# Patient Record
Sex: MALE | Race: OTHER | HISPANIC OR LATINO
[De-identification: names, ages, dates, MRNs, and addresses within clinical notes are randomized per-mention and may not be internally consistent; named-entity substitution may affect disease eponyms.]

---

## 2022-07-18 ENCOUNTER — NON-APPOINTMENT (OUTPATIENT)
Age: 47
End: 2022-07-18

## 2022-07-18 ENCOUNTER — APPOINTMENT (OUTPATIENT)
Dept: OPHTHALMOLOGY | Facility: CLINIC | Age: 47
End: 2022-07-18

## 2022-07-18 PROCEDURE — 92004 COMPRE OPH EXAM NEW PT 1/>: CPT

## 2022-07-18 PROCEDURE — 92015 DETERMINE REFRACTIVE STATE: CPT

## 2022-07-30 ENCOUNTER — INPATIENT (INPATIENT)
Facility: HOSPITAL | Age: 47
LOS: 1 days | Discharge: HOME | DRG: 512 | End: 2022-08-01
Attending: ORTHOPAEDIC SURGERY | Admitting: ORTHOPAEDIC SURGERY
Payer: COMMERCIAL

## 2022-07-30 ENCOUNTER — TRANSCRIPTION ENCOUNTER (OUTPATIENT)
Age: 47
End: 2022-07-30

## 2022-07-30 ENCOUNTER — EMERGENCY (EMERGENCY)
Facility: HOSPITAL | Age: 47
LOS: 1 days | Discharge: ANOTHER HOSPITAL | End: 2022-07-30
Attending: EMERGENCY MEDICINE
Payer: COMMERCIAL

## 2022-07-30 VITALS
RESPIRATION RATE: 20 BRPM | DIASTOLIC BLOOD PRESSURE: 88 MMHG | SYSTOLIC BLOOD PRESSURE: 133 MMHG | TEMPERATURE: 98 F | OXYGEN SATURATION: 96 % | HEART RATE: 84 BPM

## 2022-07-30 VITALS
HEIGHT: 67 IN | SYSTOLIC BLOOD PRESSURE: 134 MMHG | TEMPERATURE: 98 F | WEIGHT: 160.06 LBS | RESPIRATION RATE: 16 BRPM | OXYGEN SATURATION: 100 % | HEART RATE: 85 BPM | DIASTOLIC BLOOD PRESSURE: 76 MMHG

## 2022-07-30 VITALS
SYSTOLIC BLOOD PRESSURE: 112 MMHG | WEIGHT: 158.73 LBS | RESPIRATION RATE: 16 BRPM | HEART RATE: 62 BPM | TEMPERATURE: 99 F | OXYGEN SATURATION: 99 % | DIASTOLIC BLOOD PRESSURE: 68 MMHG

## 2022-07-30 LAB
ALBUMIN SERPL ELPH-MCNC: 3.9 G/DL — SIGNIFICANT CHANGE UP (ref 3.5–5)
ALBUMIN SERPL ELPH-MCNC: 4.7 G/DL — SIGNIFICANT CHANGE UP (ref 3.3–5)
ALP SERPL-CCNC: 91 U/L — SIGNIFICANT CHANGE UP (ref 40–120)
ALP SERPL-CCNC: 92 U/L — SIGNIFICANT CHANGE UP (ref 40–120)
ALT FLD-CCNC: 36 U/L — SIGNIFICANT CHANGE UP (ref 10–45)
ALT FLD-CCNC: 54 U/L DA — SIGNIFICANT CHANGE UP (ref 10–60)
ANION GAP SERPL CALC-SCNC: 12 MMOL/L — SIGNIFICANT CHANGE UP (ref 5–17)
ANION GAP SERPL CALC-SCNC: 15 MMOL/L — SIGNIFICANT CHANGE UP (ref 5–17)
APTT BLD: 23.6 SEC — LOW (ref 27.5–35.5)
APTT BLD: 24 SEC — LOW (ref 27.5–35.5)
AST SERPL-CCNC: 23 U/L — SIGNIFICANT CHANGE UP (ref 10–40)
AST SERPL-CCNC: 27 U/L — SIGNIFICANT CHANGE UP (ref 10–40)
BASOPHILS # BLD AUTO: 0.02 K/UL — SIGNIFICANT CHANGE UP (ref 0–0.2)
BASOPHILS # BLD AUTO: 0.07 K/UL — SIGNIFICANT CHANGE UP (ref 0–0.2)
BASOPHILS NFR BLD AUTO: 0.1 % — SIGNIFICANT CHANGE UP (ref 0–2)
BASOPHILS NFR BLD AUTO: 0.3 % — SIGNIFICANT CHANGE UP (ref 0–2)
BILIRUB SERPL-MCNC: 1.3 MG/DL — HIGH (ref 0.2–1.2)
BILIRUB SERPL-MCNC: 1.6 MG/DL — HIGH (ref 0.2–1.2)
BLD GP AB SCN SERPL QL: NEGATIVE — SIGNIFICANT CHANGE UP
BUN SERPL-MCNC: 23 MG/DL — SIGNIFICANT CHANGE UP (ref 7–23)
BUN SERPL-MCNC: 24 MG/DL — HIGH (ref 7–18)
CALCIUM SERPL-MCNC: 10.2 MG/DL — SIGNIFICANT CHANGE UP (ref 8.4–10.5)
CALCIUM SERPL-MCNC: 9.2 MG/DL — SIGNIFICANT CHANGE UP (ref 8.4–10.5)
CHLORIDE SERPL-SCNC: 104 MMOL/L — SIGNIFICANT CHANGE UP (ref 96–108)
CHLORIDE SERPL-SCNC: 110 MMOL/L — HIGH (ref 96–108)
CO2 SERPL-SCNC: 21 MMOL/L — LOW (ref 22–31)
CO2 SERPL-SCNC: 21 MMOL/L — LOW (ref 22–31)
CREAT SERPL-MCNC: 1.24 MG/DL — SIGNIFICANT CHANGE UP (ref 0.5–1.3)
CREAT SERPL-MCNC: 1.61 MG/DL — HIGH (ref 0.5–1.3)
EGFR: 52 ML/MIN/1.73M2 — LOW
EGFR: 72 ML/MIN/1.73M2 — SIGNIFICANT CHANGE UP
EOSINOPHIL # BLD AUTO: 0 K/UL — SIGNIFICANT CHANGE UP (ref 0–0.5)
EOSINOPHIL # BLD AUTO: 0.01 K/UL — SIGNIFICANT CHANGE UP (ref 0–0.5)
EOSINOPHIL NFR BLD AUTO: 0 % — SIGNIFICANT CHANGE UP (ref 0–6)
EOSINOPHIL NFR BLD AUTO: 0 % — SIGNIFICANT CHANGE UP (ref 0–6)
GLUCOSE SERPL-MCNC: 107 MG/DL — HIGH (ref 70–99)
GLUCOSE SERPL-MCNC: 86 MG/DL — SIGNIFICANT CHANGE UP (ref 70–99)
HCT VFR BLD CALC: 43.7 % — SIGNIFICANT CHANGE UP (ref 39–50)
HCT VFR BLD CALC: 45.7 % — SIGNIFICANT CHANGE UP (ref 39–50)
HGB BLD-MCNC: 14.8 G/DL — SIGNIFICANT CHANGE UP (ref 13–17)
HGB BLD-MCNC: 15.5 G/DL — SIGNIFICANT CHANGE UP (ref 13–17)
IMM GRANULOCYTES NFR BLD AUTO: 0.6 % — SIGNIFICANT CHANGE UP (ref 0–1.5)
IMM GRANULOCYTES NFR BLD AUTO: 0.7 % — SIGNIFICANT CHANGE UP (ref 0–1.5)
INR BLD: 1.07 RATIO — SIGNIFICANT CHANGE UP (ref 0.88–1.16)
INR BLD: 1.12 RATIO — SIGNIFICANT CHANGE UP (ref 0.88–1.16)
LYMPHOCYTES # BLD AUTO: 0.76 K/UL — LOW (ref 1–3.3)
LYMPHOCYTES # BLD AUTO: 10.4 % — LOW (ref 13–44)
LYMPHOCYTES # BLD AUTO: 2.27 K/UL — SIGNIFICANT CHANGE UP (ref 1–3.3)
LYMPHOCYTES # BLD AUTO: 4.9 % — LOW (ref 13–44)
MCHC RBC-ENTMCNC: 30.1 PG — SIGNIFICANT CHANGE UP (ref 27–34)
MCHC RBC-ENTMCNC: 30.3 PG — SIGNIFICANT CHANGE UP (ref 27–34)
MCHC RBC-ENTMCNC: 33.9 GM/DL — SIGNIFICANT CHANGE UP (ref 32–36)
MCHC RBC-ENTMCNC: 33.9 GM/DL — SIGNIFICANT CHANGE UP (ref 32–36)
MCV RBC AUTO: 88.7 FL — SIGNIFICANT CHANGE UP (ref 80–100)
MCV RBC AUTO: 89.4 FL — SIGNIFICANT CHANGE UP (ref 80–100)
MONOCYTES # BLD AUTO: 0.46 K/UL — SIGNIFICANT CHANGE UP (ref 0–0.9)
MONOCYTES # BLD AUTO: 1.16 K/UL — HIGH (ref 0–0.9)
MONOCYTES NFR BLD AUTO: 3 % — SIGNIFICANT CHANGE UP (ref 2–14)
MONOCYTES NFR BLD AUTO: 5.3 % — SIGNIFICANT CHANGE UP (ref 2–14)
NEUTROPHILS # BLD AUTO: 14.11 K/UL — HIGH (ref 1.8–7.4)
NEUTROPHILS # BLD AUTO: 18.11 K/UL — HIGH (ref 1.8–7.4)
NEUTROPHILS NFR BLD AUTO: 83.3 % — HIGH (ref 43–77)
NEUTROPHILS NFR BLD AUTO: 91.4 % — HIGH (ref 43–77)
NRBC # BLD: 0 /100 WBCS — SIGNIFICANT CHANGE UP (ref 0–0)
NRBC # BLD: 0 /100 WBCS — SIGNIFICANT CHANGE UP (ref 0–0)
PLATELET # BLD AUTO: 244 K/UL — SIGNIFICANT CHANGE UP (ref 150–400)
PLATELET # BLD AUTO: 296 K/UL — SIGNIFICANT CHANGE UP (ref 150–400)
POTASSIUM SERPL-MCNC: 3.7 MMOL/L — SIGNIFICANT CHANGE UP (ref 3.5–5.3)
POTASSIUM SERPL-MCNC: 4.4 MMOL/L — SIGNIFICANT CHANGE UP (ref 3.5–5.3)
POTASSIUM SERPL-SCNC: 3.7 MMOL/L — SIGNIFICANT CHANGE UP (ref 3.5–5.3)
POTASSIUM SERPL-SCNC: 4.4 MMOL/L — SIGNIFICANT CHANGE UP (ref 3.5–5.3)
PROT SERPL-MCNC: 7.3 G/DL — SIGNIFICANT CHANGE UP (ref 6–8.3)
PROT SERPL-MCNC: 7.5 G/DL — SIGNIFICANT CHANGE UP (ref 6–8.3)
PROTHROM AB SERPL-ACNC: 12.8 SEC — SIGNIFICANT CHANGE UP (ref 10.5–13.4)
PROTHROM AB SERPL-ACNC: 13 SEC — SIGNIFICANT CHANGE UP (ref 10.5–13.4)
RBC # BLD: 4.89 M/UL — SIGNIFICANT CHANGE UP (ref 4.2–5.8)
RBC # BLD: 5.15 M/UL — SIGNIFICANT CHANGE UP (ref 4.2–5.8)
RBC # FLD: 11.6 % — SIGNIFICANT CHANGE UP (ref 10.3–14.5)
RBC # FLD: 11.7 % — SIGNIFICANT CHANGE UP (ref 10.3–14.5)
RH IG SCN BLD-IMP: POSITIVE — SIGNIFICANT CHANGE UP
SARS-COV-2 RNA SPEC QL NAA+PROBE: SIGNIFICANT CHANGE UP
SODIUM SERPL-SCNC: 140 MMOL/L — SIGNIFICANT CHANGE UP (ref 135–145)
SODIUM SERPL-SCNC: 143 MMOL/L — SIGNIFICANT CHANGE UP (ref 135–145)
WBC # BLD: 15.44 K/UL — HIGH (ref 3.8–10.5)
WBC # BLD: 21.77 K/UL — HIGH (ref 3.8–10.5)
WBC # FLD AUTO: 15.44 K/UL — HIGH (ref 3.8–10.5)
WBC # FLD AUTO: 21.77 K/UL — HIGH (ref 3.8–10.5)

## 2022-07-30 PROCEDURE — 99285 EMERGENCY DEPT VISIT HI MDM: CPT | Mod: 25

## 2022-07-30 PROCEDURE — 90715 TDAP VACCINE 7 YRS/> IM: CPT

## 2022-07-30 PROCEDURE — 86901 BLOOD TYPING SEROLOGIC RH(D): CPT

## 2022-07-30 PROCEDURE — 73090 X-RAY EXAM OF FOREARM: CPT | Mod: 26,LT,77

## 2022-07-30 PROCEDURE — 90471 IMMUNIZATION ADMIN: CPT

## 2022-07-30 PROCEDURE — 87635 SARS-COV-2 COVID-19 AMP PRB: CPT

## 2022-07-30 PROCEDURE — 73110 X-RAY EXAM OF WRIST: CPT | Mod: 26,LT

## 2022-07-30 PROCEDURE — 86900 BLOOD TYPING SEROLOGIC ABO: CPT

## 2022-07-30 PROCEDURE — 71045 X-RAY EXAM CHEST 1 VIEW: CPT | Mod: 26

## 2022-07-30 PROCEDURE — 73090 X-RAY EXAM OF FOREARM: CPT

## 2022-07-30 PROCEDURE — 85730 THROMBOPLASTIN TIME PARTIAL: CPT

## 2022-07-30 PROCEDURE — 71045 X-RAY EXAM CHEST 1 VIEW: CPT | Mod: 26,77

## 2022-07-30 PROCEDURE — 85610 PROTHROMBIN TIME: CPT

## 2022-07-30 PROCEDURE — 73090 X-RAY EXAM OF FOREARM: CPT | Mod: 26,LT

## 2022-07-30 PROCEDURE — 99285 EMERGENCY DEPT VISIT HI MDM: CPT

## 2022-07-30 PROCEDURE — 93010 ELECTROCARDIOGRAM REPORT: CPT

## 2022-07-30 PROCEDURE — 71045 X-RAY EXAM CHEST 1 VIEW: CPT

## 2022-07-30 PROCEDURE — 86850 RBC ANTIBODY SCREEN: CPT

## 2022-07-30 PROCEDURE — 73080 X-RAY EXAM OF ELBOW: CPT | Mod: 26,LT

## 2022-07-30 PROCEDURE — 85025 COMPLETE CBC W/AUTO DIFF WBC: CPT

## 2022-07-30 PROCEDURE — 36415 COLL VENOUS BLD VENIPUNCTURE: CPT

## 2022-07-30 PROCEDURE — 96374 THER/PROPH/DIAG INJ IV PUSH: CPT

## 2022-07-30 PROCEDURE — 80053 COMPREHEN METABOLIC PANEL: CPT

## 2022-07-30 PROCEDURE — 96375 TX/PRO/DX INJ NEW DRUG ADDON: CPT

## 2022-07-30 RX ORDER — HYDROMORPHONE HYDROCHLORIDE 2 MG/ML
1 INJECTION INTRAMUSCULAR; INTRAVENOUS; SUBCUTANEOUS ONCE
Refills: 0 | Status: DISCONTINUED | OUTPATIENT
Start: 2022-07-30 | End: 2022-07-30

## 2022-07-30 RX ORDER — CEFAZOLIN SODIUM 1 G
1000 VIAL (EA) INJECTION ONCE
Refills: 0 | Status: COMPLETED | OUTPATIENT
Start: 2022-07-30 | End: 2022-07-30

## 2022-07-30 RX ORDER — MORPHINE SULFATE 50 MG/1
4 CAPSULE, EXTENDED RELEASE ORAL ONCE
Refills: 0 | Status: DISCONTINUED | OUTPATIENT
Start: 2022-07-30 | End: 2022-07-30

## 2022-07-30 RX ORDER — SODIUM CHLORIDE 9 MG/ML
1000 INJECTION, SOLUTION INTRAVENOUS
Refills: 0 | Status: DISCONTINUED | OUTPATIENT
Start: 2022-07-30 | End: 2022-07-31

## 2022-07-30 RX ORDER — TETANUS TOXOID, REDUCED DIPHTHERIA TOXOID AND ACELLULAR PERTUSSIS VACCINE, ADSORBED 5; 2.5; 8; 8; 2.5 [IU]/.5ML; [IU]/.5ML; UG/.5ML; UG/.5ML; UG/.5ML
0.5 SUSPENSION INTRAMUSCULAR ONCE
Refills: 0 | Status: COMPLETED | OUTPATIENT
Start: 2022-07-30 | End: 2022-07-30

## 2022-07-30 RX ADMIN — Medication 100 MILLIGRAM(S): at 19:39

## 2022-07-30 RX ADMIN — HYDROMORPHONE HYDROCHLORIDE 1 MILLIGRAM(S): 2 INJECTION INTRAMUSCULAR; INTRAVENOUS; SUBCUTANEOUS at 23:49

## 2022-07-30 RX ADMIN — SODIUM CHLORIDE 125 MILLILITER(S): 9 INJECTION, SOLUTION INTRAVENOUS at 23:59

## 2022-07-30 RX ADMIN — TETANUS TOXOID, REDUCED DIPHTHERIA TOXOID AND ACELLULAR PERTUSSIS VACCINE, ADSORBED 0.5 MILLILITER(S): 5; 2.5; 8; 8; 2.5 SUSPENSION INTRAMUSCULAR at 17:32

## 2022-07-30 RX ADMIN — MORPHINE SULFATE 4 MILLIGRAM(S): 50 CAPSULE, EXTENDED RELEASE ORAL at 16:32

## 2022-07-30 RX ADMIN — HYDROMORPHONE HYDROCHLORIDE 1 MILLIGRAM(S): 2 INJECTION INTRAMUSCULAR; INTRAVENOUS; SUBCUTANEOUS at 20:10

## 2022-07-30 RX ADMIN — MORPHINE SULFATE 4 MILLIGRAM(S): 50 CAPSULE, EXTENDED RELEASE ORAL at 19:05

## 2022-07-30 RX ADMIN — HYDROMORPHONE HYDROCHLORIDE 1 MILLIGRAM(S): 2 INJECTION INTRAMUSCULAR; INTRAVENOUS; SUBCUTANEOUS at 19:39

## 2022-07-30 NOTE — ED PROVIDER NOTE - OBJECTIVE STATEMENT
46 yo M denies pmh presents with L arm pain s/p mechanical slip and fall just pta while playing soccer. Denies other injuries or complaints.

## 2022-07-30 NOTE — ED PROVIDER NOTE - ATTESTATION, MLM
I have reviewed and confirmed nurses' notes for patient's medications, allergies, medical history, and surgical history. normal (ped)...

## 2022-07-30 NOTE — ED PROVIDER NOTE - ATTENDING CONTRIBUTION TO CARE
Attending Statement (URBANO Mora MD):    Attending Statement: This was a shared visit with the ELISSA. I reviewed and verified the documentation and independently performed the documented:     HPI: 48y/o M with no reported medical comorbidities, presenting with left arm pain/deformity: presented to outside hospital after he fell playing soccer, and reports breaking arm with bone sticking out of skin, which he quickly pushed back in.  Found at OSH to have displaced fractures of the radius and ulna, was given ancef and dilaudid, and transferred here for orthopedic consultation. Reports he has no PMH/PSH, NKDA, and is not on any chronic/daily mediations. Nonsmoker. Denies other injuries.    Review of Systems:  -General: no fever  -ENT: no congestion  -Pulmonary: no cough, no shortness of breath  -Cardiac: no chest pain  -Gastrointestinal: no abdominal pain, no nausea, no vomiting, and no diarrhea.  -Genitourinary: no blood or pain with urination  -Musculoskeletal: no back or neck pain; see hpi  -Skin: no rashes  -Endocrine: No h/o diabetes  -Neurologic: No numbness in extremities    All else negative unless otherwise specified elsewhere in this note.    PSH/PMH as noted above    On Physical Exam:  General: well appearing, in NAD, speaking clearly in full sentences and without difficulty; cooperative with exam  HEENT: PERRL, MMM  Neck: no neck tenderness, no nuchal rigidity  Cardiac: normal s1, s2; RRR; no MGR  Lungs: CTABL  Abdomen: soft nontender/nondistended  Skin: intact, no rash  Extremities: LUE: gross mid forearm deformity, convexity; laceration (approx 3cm to ulnar aspect of mid forearm; radial pulse palpable; cap refill in all fingers <2 sec, sensation intact in all L fingers, moving all L fingers  Neuro: sensation intact in LUE    MDM: Open mid shaft ulnar and radius fractures; s/p ancef prior to transfer (administered at approx 7pm tonight); repeat labs, covid pcr, and repeat x-rays (as per orthopedic service request) and consulting with ortho for possible OR and washout.

## 2022-07-30 NOTE — ED PROVIDER NOTE - PROGRESS NOTE DETAILS
Ortho consulted (Dr Toro) who recommends transfer  Spoke with transfer line who connected me with [facility] ortho Dr Angelo who rec ED to ED transfer and states his ortho resident will reduce and splint pt in ED  Pt consented for transfer   Endorsed to Dr Mccullough in ED

## 2022-07-30 NOTE — ED PROVIDER NOTE - CARE PLAN
1 Principal Discharge DX:	Open displaced fracture of neck of radius   Principal Discharge DX:	Fracture of shafts of both ulna and radius, open, left, type I or II, initial encounter

## 2022-07-30 NOTE — ED ADULT NURSE REASSESSMENT NOTE - NS ED NURSE REASSESS COMMENT FT1
Pt is alert and oriented x3. Pt c/o pain to the left upper extremity. Pt is medicated per order. No sign of acute distress noted at this time. Safety maintained. Awaiting transport.

## 2022-07-30 NOTE — ED PROVIDER NOTE - CLINICAL SUMMARY MEDICAL DECISION MAKING FREE TEXT BOX
47yM with no PMHx presenting after fall while playing soccer, sustained open fracture of both ulna and radius, transferred from [place] to Dr. Angelo for orthopedic surgery and reduction. Vitals normal, pain well controlled at this time. Pt A&Ox4. Ortho already called and pre-op labs and imaging ordered. He already received tetanus and ancef at [place].

## 2022-07-30 NOTE — ED ADULT NURSE NOTE - NSIMPLEMENTINTERV_GEN_ALL_ED
COUGH Implemented All Fall Risk Interventions:  [place] to call system. Call bell, personal items and telephone within reach. Instruct patient to call for assistance. Room bathroom lighting operational. Non-slip footwear when patient is off stretcher. Physically safe environment: no spills, clutter or unnecessary equipment. Stretcher in lowest position, wheels locked, appropriate side rails in place. Provide visual cue, wrist band, yellow gown, etc. Monitor gait and stability. Monitor for mental status changes and reorient to person, place, and time. Review medications for side effects contributing to fall risk. Reinforce activity limits and safety measures with patient and family.

## 2022-07-30 NOTE — ED ADULT NURSE NOTE - OBJECTIVE STATEMENT
46 y/o male c/l fall , Lt arm pain , axo 3, ambulatory. Medicated as per order. Sent x ray. Will continue to monitor

## 2022-07-30 NOTE — ED PROVIDER NOTE - PHYSICAL EXAMINATION
GENERAL: well appearing, moderate painful distress   HEAD: atraumatic   EYES: EOMI   ENT: moist oral mucosa   CARDIAC: regular rate  RESPIRATORY: no increased work of breathing   MUSCULOSKELETAL: obvious deformity of L forearm with wound to palmar surface of mid forearm; able to move wrist and hand and fingers; neuro vasc intact   NEUROLOGICAL: alert, spontaneous movement of extremities   SKIN: no visible rash  PSYCHIATRIC: cooperative

## 2022-07-30 NOTE — ED PROVIDER NOTE - PRINCIPAL DIAGNOSIS
Open displaced fracture of neck of radius Fracture of shafts of both ulna and radius, open, left, type I or II, initial encounter

## 2022-07-30 NOTE — ED PROVIDER NOTE - PHYSICAL EXAMINATION
GENERAL: Sitting comfortably in bed in no acute distress, in significant distress when arm is moved.  NEURO: Alert and Oriented to person, place, date and situation. Pupils symmetric, No ptosis. No facial asymmetry or dysarthria, no tremor noted.  HEENT: No conjunctival injection or scleral icterus.   CARD: Normal rate and regular rhythm, no murmurs and no gallops appreciated.  RESP: Clear to auscultation bilaterally, No wheezes, rales or rhonchi. Good respiratory effort.  ABD: Nondistended, Soft and nontender to palpation in all quadrants, no guarding, no rigidity. No masses appreciated.  EXT: L arm held in front of body, obvious deformity to the forearm, is able to move all 5 fingers 2/5, sensation intact and 2+ radial pulse, hand warm and not edematous. Dried blood and turf material on the under side of arm with small open wound. No active bleeding. No other deformity or tenderness or injury to other limbs.

## 2022-07-30 NOTE — ED PROVIDER NOTE - OBJECTIVE STATEMENT
47y M presents to the ED as a transfer from [place] for reduction and splinting after L ulnar and radial fracture. Pt reports L arm pain s/p mechanical trip and fall while playing soccer. Pt states FOOSHing on L arm after colliding w/ two defenders. Pt saw bone sticking out of skin and placed it back immediately. Pt was given Ancef and Dilaudid (1mg) at 7:00 PM. Denies injury to shoulder, elbow, other arm. 47y M presents to the ED as a transfer from [place] for reduction and splinting after L ulnar and radial fracture. Pt reports L arm pain s/p mechanical trip and fall while playing soccer. Pt states FOOSHing on L arm after colliding w/ two defenders. Pt saw bone sticking out of skin and placed it back immediately. Pt was given Ancef and Dilaudid (1mg) at 7:00 PM. Denies injury to shoulder, elbow, other arm, numbness, tingling, weakness. Pt received TDAP while at [place].

## 2022-07-31 DIAGNOSIS — S52.102B: ICD-10-CM

## 2022-07-31 DIAGNOSIS — R52 PAIN, UNSPECIFIED: ICD-10-CM

## 2022-07-31 DIAGNOSIS — D72.829 ELEVATED WHITE BLOOD CELL COUNT, UNSPECIFIED: ICD-10-CM

## 2022-07-31 DIAGNOSIS — S52.302B: ICD-10-CM

## 2022-07-31 LAB
24R-OH-CALCIDIOL SERPL-MCNC: 34.7 NG/ML — SIGNIFICANT CHANGE UP (ref 30–80)
ALBUMIN SERPL ELPH-MCNC: 4.2 G/DL — SIGNIFICANT CHANGE UP (ref 3.3–5)
ANION GAP SERPL CALC-SCNC: 13 MMOL/L — SIGNIFICANT CHANGE UP (ref 5–17)
ANION GAP SERPL CALC-SCNC: 16 MMOL/L — SIGNIFICANT CHANGE UP (ref 5–17)
APTT BLD: 23 SEC — LOW (ref 27.5–35.5)
BASOPHILS # BLD AUTO: 0.01 K/UL — SIGNIFICANT CHANGE UP (ref 0–0.2)
BASOPHILS NFR BLD AUTO: 0.1 % — SIGNIFICANT CHANGE UP (ref 0–2)
BUN SERPL-MCNC: 22 MG/DL — SIGNIFICANT CHANGE UP (ref 7–23)
BUN SERPL-MCNC: 23 MG/DL — SIGNIFICANT CHANGE UP (ref 7–23)
CALCIUM SERPL-MCNC: 8.4 MG/DL — SIGNIFICANT CHANGE UP (ref 8.4–10.5)
CALCIUM SERPL-MCNC: 8.7 MG/DL — SIGNIFICANT CHANGE UP (ref 8.4–10.5)
CHLORIDE SERPL-SCNC: 104 MMOL/L — SIGNIFICANT CHANGE UP (ref 96–108)
CHLORIDE SERPL-SCNC: 107 MMOL/L — SIGNIFICANT CHANGE UP (ref 96–108)
CO2 SERPL-SCNC: 18 MMOL/L — LOW (ref 22–31)
CO2 SERPL-SCNC: 21 MMOL/L — LOW (ref 22–31)
CREAT SERPL-MCNC: 1.07 MG/DL — SIGNIFICANT CHANGE UP (ref 0.5–1.3)
CREAT SERPL-MCNC: 1.1 MG/DL — SIGNIFICANT CHANGE UP (ref 0.5–1.3)
EGFR: 83 ML/MIN/1.73M2 — SIGNIFICANT CHANGE UP
EGFR: 86 ML/MIN/1.73M2 — SIGNIFICANT CHANGE UP
EOSINOPHIL # BLD AUTO: 0 K/UL — SIGNIFICANT CHANGE UP (ref 0–0.5)
EOSINOPHIL NFR BLD AUTO: 0 % — SIGNIFICANT CHANGE UP (ref 0–6)
FLUAV AG NPH QL: SIGNIFICANT CHANGE UP
FLUBV AG NPH QL: SIGNIFICANT CHANGE UP
GLUCOSE SERPL-MCNC: 106 MG/DL — HIGH (ref 70–99)
GLUCOSE SERPL-MCNC: 123 MG/DL — HIGH (ref 70–99)
HCT VFR BLD CALC: 38.5 % — LOW (ref 39–50)
HCT VFR BLD CALC: 40.4 % — SIGNIFICANT CHANGE UP (ref 39–50)
HGB BLD-MCNC: 12.9 G/DL — LOW (ref 13–17)
HGB BLD-MCNC: 13.6 G/DL — SIGNIFICANT CHANGE UP (ref 13–17)
IMM GRANULOCYTES NFR BLD AUTO: 0.4 % — SIGNIFICANT CHANGE UP (ref 0–1.5)
INR BLD: 1.16 RATIO — SIGNIFICANT CHANGE UP (ref 0.88–1.16)
LYMPHOCYTES # BLD AUTO: 0.78 K/UL — LOW (ref 1–3.3)
LYMPHOCYTES # BLD AUTO: 5.8 % — LOW (ref 13–44)
MCHC RBC-ENTMCNC: 29.9 PG — SIGNIFICANT CHANGE UP (ref 27–34)
MCHC RBC-ENTMCNC: 30.1 PG — SIGNIFICANT CHANGE UP (ref 27–34)
MCHC RBC-ENTMCNC: 33.5 GM/DL — SIGNIFICANT CHANGE UP (ref 32–36)
MCHC RBC-ENTMCNC: 33.7 GM/DL — SIGNIFICANT CHANGE UP (ref 32–36)
MCV RBC AUTO: 89.3 FL — SIGNIFICANT CHANGE UP (ref 80–100)
MCV RBC AUTO: 89.4 FL — SIGNIFICANT CHANGE UP (ref 80–100)
MONOCYTES # BLD AUTO: 0.57 K/UL — SIGNIFICANT CHANGE UP (ref 0–0.9)
MONOCYTES NFR BLD AUTO: 4.2 % — SIGNIFICANT CHANGE UP (ref 2–14)
NEUTROPHILS # BLD AUTO: 12.03 K/UL — HIGH (ref 1.8–7.4)
NEUTROPHILS NFR BLD AUTO: 89.5 % — HIGH (ref 43–77)
NRBC # BLD: 0 /100 WBCS — SIGNIFICANT CHANGE UP (ref 0–0)
NRBC # BLD: 0 /100 WBCS — SIGNIFICANT CHANGE UP (ref 0–0)
PLATELET # BLD AUTO: 206 K/UL — SIGNIFICANT CHANGE UP (ref 150–400)
PLATELET # BLD AUTO: 225 K/UL — SIGNIFICANT CHANGE UP (ref 150–400)
POTASSIUM SERPL-MCNC: 4.1 MMOL/L — SIGNIFICANT CHANGE UP (ref 3.5–5.3)
POTASSIUM SERPL-MCNC: 4.3 MMOL/L — SIGNIFICANT CHANGE UP (ref 3.5–5.3)
POTASSIUM SERPL-SCNC: 4.1 MMOL/L — SIGNIFICANT CHANGE UP (ref 3.5–5.3)
POTASSIUM SERPL-SCNC: 4.3 MMOL/L — SIGNIFICANT CHANGE UP (ref 3.5–5.3)
PROTHROM AB SERPL-ACNC: 13.4 SEC — SIGNIFICANT CHANGE UP (ref 10.5–13.4)
RBC # BLD: 4.31 M/UL — SIGNIFICANT CHANGE UP (ref 4.2–5.8)
RBC # BLD: 4.52 M/UL — SIGNIFICANT CHANGE UP (ref 4.2–5.8)
RBC # FLD: 11.8 % — SIGNIFICANT CHANGE UP (ref 10.3–14.5)
RBC # FLD: 11.8 % — SIGNIFICANT CHANGE UP (ref 10.3–14.5)
RSV RNA NPH QL NAA+NON-PROBE: SIGNIFICANT CHANGE UP
SARS-COV-2 RNA SPEC QL NAA+PROBE: SIGNIFICANT CHANGE UP
SODIUM SERPL-SCNC: 138 MMOL/L — SIGNIFICANT CHANGE UP (ref 135–145)
SODIUM SERPL-SCNC: 141 MMOL/L — SIGNIFICANT CHANGE UP (ref 135–145)
WBC # BLD: 11.27 K/UL — HIGH (ref 3.8–10.5)
WBC # BLD: 13.44 K/UL — HIGH (ref 3.8–10.5)
WBC # FLD AUTO: 11.27 K/UL — HIGH (ref 3.8–10.5)
WBC # FLD AUTO: 13.44 K/UL — HIGH (ref 3.8–10.5)

## 2022-07-31 PROCEDURE — 25575 OPTX RDL&ULN SHFT FX RDS&ULN: CPT | Mod: LT

## 2022-07-31 PROCEDURE — 11012 DEB SKIN BONE AT FX SITE: CPT | Mod: LT

## 2022-07-31 PROCEDURE — 73090 X-RAY EXAM OF FOREARM: CPT | Mod: 26,LT

## 2022-07-31 DEVICE — K-WIRE SYNTHES (THREADED) TROCAR POINT 1.6MM X 150MM: Type: IMPLANTABLE DEVICE | Site: LEFT | Status: FUNCTIONAL

## 2022-07-31 DEVICE — SCREW LOKG SLF-TPNG W/ STARDRIVE RECESS 3.5X18MM: Type: IMPLANTABLE DEVICE | Site: LEFT | Status: FUNCTIONAL

## 2022-07-31 DEVICE — SCREW CORT S-T 3.5X18MM: Type: IMPLANTABLE DEVICE | Site: LEFT | Status: FUNCTIONAL

## 2022-07-31 DEVICE — SCREW CORTEX SLF TAPPING W/TB STARDRIVE RECESS 2.4X18MM: Type: IMPLANTABLE DEVICE | Site: LEFT | Status: FUNCTIONAL

## 2022-07-31 DEVICE — PLATE LCP 6H 3.5X85MM: Type: IMPLANTABLE DEVICE | Site: LEFT | Status: FUNCTIONAL

## 2022-07-31 DEVICE — SCREW LOKG SLF-TPNG W/ STARDRIVE RECESS 3.5X14MM: Type: IMPLANTABLE DEVICE | Site: LEFT | Status: FUNCTIONAL

## 2022-07-31 DEVICE — SCREW LOKG SLF-TPNG W/ STARDRIVE RECESS 3.5X16MM: Type: IMPLANTABLE DEVICE | Site: LEFT | Status: FUNCTIONAL

## 2022-07-31 DEVICE — SCREW CORT S-T 3.5X16MM: Type: IMPLANTABLE DEVICE | Site: LEFT | Status: FUNCTIONAL

## 2022-07-31 DEVICE — IMPLANTABLE DEVICE: Type: IMPLANTABLE DEVICE | Site: LEFT | Status: FUNCTIONAL

## 2022-07-31 RX ORDER — SENNA PLUS 8.6 MG/1
2 TABLET ORAL AT BEDTIME
Refills: 0 | Status: DISCONTINUED | OUTPATIENT
Start: 2022-07-31 | End: 2022-08-01

## 2022-07-31 RX ORDER — ACETAMINOPHEN 500 MG
1000 TABLET ORAL ONCE
Refills: 0 | Status: DISCONTINUED | OUTPATIENT
Start: 2022-07-31 | End: 2022-08-01

## 2022-07-31 RX ORDER — ACETAMINOPHEN 500 MG
975 TABLET ORAL EVERY 8 HOURS
Refills: 0 | Status: DISCONTINUED | OUTPATIENT
Start: 2022-07-31 | End: 2022-08-01

## 2022-07-31 RX ORDER — TRAMADOL HYDROCHLORIDE 50 MG/1
50 TABLET ORAL EVERY 8 HOURS
Refills: 0 | Status: DISCONTINUED | OUTPATIENT
Start: 2022-07-31 | End: 2022-08-01

## 2022-07-31 RX ORDER — FENTANYL CITRATE 50 UG/ML
25 INJECTION INTRAVENOUS
Refills: 0 | Status: DISCONTINUED | OUTPATIENT
Start: 2022-07-31 | End: 2022-07-31

## 2022-07-31 RX ORDER — MAGNESIUM HYDROXIDE 400 MG/1
30 TABLET, CHEWABLE ORAL DAILY
Refills: 0 | Status: DISCONTINUED | OUTPATIENT
Start: 2022-07-31 | End: 2022-08-01

## 2022-07-31 RX ORDER — LANOLIN ALCOHOL/MO/W.PET/CERES
3 CREAM (GRAM) TOPICAL AT BEDTIME
Refills: 0 | Status: DISCONTINUED | OUTPATIENT
Start: 2022-07-31 | End: 2022-08-01

## 2022-07-31 RX ORDER — SODIUM CHLORIDE 9 MG/ML
1000 INJECTION INTRAMUSCULAR; INTRAVENOUS; SUBCUTANEOUS
Refills: 0 | Status: DISCONTINUED | OUTPATIENT
Start: 2022-07-31 | End: 2022-08-01

## 2022-07-31 RX ORDER — OXYCODONE HYDROCHLORIDE 5 MG/1
10 TABLET ORAL EVERY 4 HOURS
Refills: 0 | Status: DISCONTINUED | OUTPATIENT
Start: 2022-07-31 | End: 2022-08-01

## 2022-07-31 RX ORDER — FENTANYL CITRATE 50 UG/ML
50 INJECTION INTRAVENOUS ONCE
Refills: 0 | Status: DISCONTINUED | OUTPATIENT
Start: 2022-07-31 | End: 2022-07-31

## 2022-07-31 RX ORDER — OXYCODONE HYDROCHLORIDE 5 MG/1
5 TABLET ORAL EVERY 4 HOURS
Refills: 0 | Status: DISCONTINUED | OUTPATIENT
Start: 2022-07-31 | End: 2022-08-01

## 2022-07-31 RX ORDER — CEFAZOLIN SODIUM 1 G
2000 VIAL (EA) INJECTION ONCE
Refills: 0 | Status: COMPLETED | OUTPATIENT
Start: 2022-07-31 | End: 2022-07-31

## 2022-07-31 RX ORDER — CEFAZOLIN SODIUM 1 G
2000 VIAL (EA) INJECTION EVERY 8 HOURS
Refills: 0 | Status: COMPLETED | OUTPATIENT
Start: 2022-07-31 | End: 2022-08-01

## 2022-07-31 RX ORDER — FAMOTIDINE 10 MG/ML
20 INJECTION INTRAVENOUS
Refills: 0 | Status: DISCONTINUED | OUTPATIENT
Start: 2022-07-31 | End: 2022-08-01

## 2022-07-31 RX ORDER — HYDROMORPHONE HYDROCHLORIDE 2 MG/ML
0.5 INJECTION INTRAMUSCULAR; INTRAVENOUS; SUBCUTANEOUS ONCE
Refills: 0 | Status: DISCONTINUED | OUTPATIENT
Start: 2022-07-31 | End: 2022-07-31

## 2022-07-31 RX ORDER — SODIUM CHLORIDE 9 MG/ML
1000 INJECTION INTRAMUSCULAR; INTRAVENOUS; SUBCUTANEOUS
Refills: 0 | Status: DISCONTINUED | OUTPATIENT
Start: 2022-07-31 | End: 2022-07-31

## 2022-07-31 RX ADMIN — Medication 975 MILLIGRAM(S): at 23:11

## 2022-07-31 RX ADMIN — OXYCODONE HYDROCHLORIDE 10 MILLIGRAM(S): 5 TABLET ORAL at 16:16

## 2022-07-31 RX ADMIN — OXYCODONE HYDROCHLORIDE 10 MILLIGRAM(S): 5 TABLET ORAL at 03:59

## 2022-07-31 RX ADMIN — Medication 100 MILLIGRAM(S): at 14:59

## 2022-07-31 RX ADMIN — OXYCODONE HYDROCHLORIDE 10 MILLIGRAM(S): 5 TABLET ORAL at 04:35

## 2022-07-31 RX ADMIN — HYDROMORPHONE HYDROCHLORIDE 0.5 MILLIGRAM(S): 2 INJECTION INTRAMUSCULAR; INTRAVENOUS; SUBCUTANEOUS at 00:33

## 2022-07-31 RX ADMIN — OXYCODONE HYDROCHLORIDE 10 MILLIGRAM(S): 5 TABLET ORAL at 16:46

## 2022-07-31 RX ADMIN — Medication 100 MILLIGRAM(S): at 22:41

## 2022-07-31 RX ADMIN — Medication 975 MILLIGRAM(S): at 14:32

## 2022-07-31 RX ADMIN — FAMOTIDINE 20 MILLIGRAM(S): 10 INJECTION INTRAVENOUS at 17:55

## 2022-07-31 RX ADMIN — Medication 975 MILLIGRAM(S): at 14:02

## 2022-07-31 RX ADMIN — Medication 100 MILLIGRAM(S): at 03:59

## 2022-07-31 RX ADMIN — Medication 975 MILLIGRAM(S): at 22:41

## 2022-07-31 NOTE — PHYSICAL THERAPY INITIAL EVALUATION ADULT - ADDITIONAL COMMENTS
Pt lives in a private house with family with 10 steps to enter and first floor setup.  Pt was Ind with all ADLs and amb without AD.

## 2022-07-31 NOTE — OCCUPATIONAL THERAPY INITIAL EVALUATION ADULT - ANTICIPATED DISCHARGE DISPOSITION, OT EVAL
Recommending home, when cleared per medical team outpatient OT services to improve strength, sensation, coordination of LUE  to facilitate functional use during ADLs. Discussed with  Linda.

## 2022-07-31 NOTE — OCCUPATIONAL THERAPY INITIAL EVALUATION ADULT - PERTINENT HX OF CURRENT PROBLEM, REHAB EVAL
47M presents to the ED as a transfer from [place] for reduction and splinting after L ulnar and radial fracture. Pt reports L arm pain s/p mechanical trip and fall while playing soccer, after colliding w/ two defenders. Pt saw bone sticking out of skin and placed it back immediately. Pt now s/p Open reduction and internal fixation of the ulna and radius. KE DOWNING in sling.

## 2022-07-31 NOTE — CONSULT NOTE ADULT - SUBJECTIVE AND OBJECTIVE BOX
47y M presents to the ED as a transfer from [place] for reduction and splinting after L ulnar and radial fracture. Pt reports L arm pain s/p mechanical trip and fall while playing soccer. Pt states FOOSHing on L arm after colliding w/ two defenders. Pt saw bone sticking out of skin and placed it back immediately. Pt was given Ancef and Dilaudid (1mg) at 7:00 PM. Denies injury to shoulder, elbow, other arm, numbness, tingling, weakness. Pt received TDAP while at [place]. Patient was transfered to [facility] for further treatment and is scheduled for an Open reduction and internal fixation of the left arm. Patient seen now resting comfortably.        PAST MEDICAL & SURGICAL HISTORY:  No significant PMH or PSH      MEDICATIONS  (STANDING):  acetaminophen     Tablet .. 975 milliGRAM(s) Oral every 8 hours  lactated ringers. 1000 milliLiter(s) (125 mL/Hr) IV Continuous <Continuous>  senna 2 Tablet(s) Oral at bedtime  sodium chloride 0.9%. 1000 milliLiter(s) (125 mL/Hr) IV Continuous <Continuous>    MEDICATIONS  (PRN):  magnesium hydroxide Suspension 30 milliLiter(s) Oral daily PRN Constipation  melatonin 3 milliGRAM(s) Oral at bedtime PRN Insomnia  oxyCODONE    IR 5 milliGRAM(s) Oral every 4 hours PRN Moderate Pain (4 - 6)  oxyCODONE    IR 10 milliGRAM(s) Oral every 4 hours PRN Severe Pain (7 - 10)      Social Hx:  Tobacco: Neg  ETOH: Occasionally  Drugs: Neg    Family Hx:  As per my conversation with the patient, non contributory     ROS   CONSTITUTIONAL: No weakness, fevers or chills  EYES/ENT: No visual changes;  No vertigo or throat pain   NECK: No pain or stiffness  RESPIRATORY: No cough, wheezing, hemoptysis; No shortness of breath  CARDIOVASCULAR: No chest pain or palpitations  GASTROINTESTINAL: No abdominal or epigastric pain. No nausea, vomiting, or hematemesis; No diarrhea or constipation. No melena or hematochezia.  GENITOURINARY: No dysuria, frequency or hematuria  NEUROLOGICAL: No numbness or weakness  SKIN: No itching, burning, rashes, or lesions   MUSCULOSKELETAL: left arm pain    INTERVAL HPI/OVERNIGHT EVENTS:  T(C): 37.1 (07-31-22 @ 07:12), Max: 37.2 (07-30-22 @ 22:33)  HR: 76 (07-31-22 @ 07:12) (60 - 90)  BP: 123/77 (07-31-22 @ 07:12) (112/68 - 128/84)  RR: 18 (07-31-22 @ 07:12) (16 - 18)  SpO2: 98% (07-31-22 @ 07:12) (97% - 99%)  Wt(kg): --  I&O's Summary      PHYSICAL EXAM:  GENERAL: NAD, well-groomed, well-developed  HEAD:  Atraumatic, Normocephalic  EYES: EOMI, PERRLA, conjunctiva and sclera clear  ENMT: No tonsillar erythema, exudates, or enlargement; Moist mucous membranes, Good dentition, No lesions  NECK: Supple, No JVD, Normal thyroid  NERVOUS SYSTEM:  Alert & Oriented X3, Good concentration; Motor Strength 5/5 B/L upper and lower extremities; DTRs 2+ intact and symmetric  CHEST/LUNG: Clear to percussion bilaterally; No rales, rhonchi, wheezing, or rubs  HEART: Regular rate and rhythm; No murmurs, rubs, or gallops  ABDOMEN: Soft, Nontender, Nondistended; Bowel sounds present  EXTREMITIES:  2+ Peripheral Pulses, No clubbing, cyanosis, or edema  LYMPH: No lymphadenopathy noted  SKIN: No rashes or lesions        LABS:                        13.6   13.44 )-----------( 225      ( 31 Jul 2022 01:37 )             40.4     07-31    141  |  107  |  22  ----------------------------<  106<H>  4.1   |  18<L>  |  1.07    Ca    8.4      31 Jul 2022 01:37    TPro  x   /  Alb  4.2  /  TBili  x   /  DBili  x   /  AST  x   /  ALT  x   /  AlkPhos  x   07-31    PT/INR - ( 31 Jul 2022 01:37 )   PT: 13.4 sec;   INR: 1.16 ratio         PTT - ( 31 Jul 2022 01:37 )  PTT:23.0 sec    EKG Pending

## 2022-07-31 NOTE — PHYSICAL THERAPY INITIAL EVALUATION ADULT - PERTINENT HX OF CURRENT PROBLEM, REHAB EVAL
Pt is a 48 y/o male admitted to [facility] on 7/31/22  RHD c/o L forearm pain s/p MF playing soccer today. Went to [facility] where he was transferred over. Patient denies head hit or LOC. Endorses tingling in distal portion of L 5th digit.  L forearm XR:Acute displaced angulated fractures of the left radial and ulnar diaphysis.

## 2022-07-31 NOTE — PROGRESS NOTE ADULT - SUBJECTIVE AND OBJECTIVE BOX
Patient resting bedside. Reports L upper extremity pain with movement.   No chest pain, SOB, N/V.    T(C): 37.1 (07-31-22 @ 07:12), Max: 37.2 (07-30-22 @ 22:33)  HR: 76 (07-31-22 @ 07:12) (60 - 90)  BP: 123/77 (07-31-22 @ 07:12) (112/68 - 128/84)  RR: 18 (07-31-22 @ 07:12) (16 - 18)  SpO2: 98% (07-31-22 @ 07:12) (97% - 99%)  Wt(kg): --    Exam:  Alert and Oriented, No Acute Distress    Upper Extremities: L Forearm  Posterior Slab Splint intact.  Compartments Soft, no signs of compartment syndrome  Wiggles digits 1-5  SILT digits 1-5 this AM  Radial Pulse    Xray: Impression:  Interval splint for acute fractures of the left radius and ulnar diaphysis with persistent volar and radial displacement. There is decreased angulation.  No dislocation.  Joint spaces are maintained.    --- End of Report ---                               13.6   13.44 )-----------( 225      ( 31 Jul 2022 01:37 )             40.4    07-31    141  |  107  |  22  ----------------------------<  106<H>  4.1   |  18<L>  |  1.07    Ca    8.4      31 Jul 2022 01:37    TPro  x   /  Alb  4.2  /  TBili  x   /  DBili  x   /  AST  x   /  ALT  x   /  AlkPhos  x   07-31      A/p: 47y Male s/p L Forearm both bone Fx.  VSS. NAD.    PT/OT-WBAT  IS  DVT PPx  Pain Control  Continue Current Tx.    Lawrence Garcia PA-C  Team Pager: #[phone]     Patient resting bedside. Reports L upper extremity pain with movement.   No chest pain, SOB, N/V.    T(C): 37.1 (07-31-22 @ 07:12), Max: 37.2 (07-30-22 @ 22:33)  HR: 76 (07-31-22 @ 07:12) (60 - 90)  BP: 123/77 (07-31-22 @ 07:12) (112/68 - 128/84)  RR: 18 (07-31-22 @ 07:12) (16 - 18)  SpO2: 98% (07-31-22 @ 07:12) (97% - 99%)  Wt(kg): --    Exam:  Alert and Oriented, No Acute Distress    Upper Extremities: L Forearm  Posterior Slab Splint intact.  Compartments Soft, no signs of compartment syndrome  Wiggles digits 1-5  SILT digits 1-5 this AM  Normal blanching and capillary refill < 2 seconds digits 1-5     Xray: Impression:  Interval splint for acute fractures of the left radius and ulnar diaphysis with persistent volar and radial displacement. There is decreased angulation.  No dislocation.  Joint spaces are maintained.    --- End of Report ---                               13.6   13.44 )-----------( 225      ( 31 Jul 2022 01:37 )             40.4    07-31    141  |  107  |  22  ----------------------------<  106<H>  4.1   |  18<L>  |  1.07    Ca    8.4      31 Jul 2022 01:37    TPro  x   /  Alb  4.2  /  TBili  x   /  DBili  x   /  AST  x   /  ALT  x   /  AlkPhos  x   07-31

## 2022-07-31 NOTE — CONSULT NOTE ADULT - ASSESSMENT
48 yo male presents with an open left forearm fracture. Patient is scheduled for an Open reduction and internal fixation of the left forearm

## 2022-07-31 NOTE — CONSULT NOTE ADULT - PROBLEM SELECTOR RECOMMENDATION 9
Patient with a both bone fracture of the left arm  Patient scheduled for an Open reduction and internal fixation of the left forearm  No contraindication to scheduled procedure  Patient is NPO  Received ancef at [facility]  further abx as per Ortho  DVT prophylaxis

## 2022-07-31 NOTE — PATIENT PROFILE ADULT - FALL HARM RISK - HARM RISK INTERVENTIONS
PROCEDURES:  Left total knee replacement 15-Jul-2019 11:53:41  Zac Jernigan Communicate Risk of Fall with Harm to all staff/Reinforce activity limits and safety measures with patient and family/Tailored Fall Risk Interventions/Visual Cue: Yellow wristband and red socks/Bed in lowest position, wheels locked, appropriate side rails in place/Call bell, personal items and telephone in reach/Instruct patient to call for assistance before getting out of bed or chair/Non-slip footwear when patient is out of bed/[place] to call system/Physically safe environment - no spills, clutter or unnecessary equipment/Purposeful Proactive Rounding/Room/bathroom lighting operational, light cord in reach

## 2022-07-31 NOTE — PROGRESS NOTE ADULT - SUBJECTIVE AND OBJECTIVE BOX
ORTHO ATTENDING POST OP    s/p I and D, ORIF L BBFA  NWB L  UE  sling  sling may be removed PRN  ROM as tolerated   LUE  bulky dressing  elevation  ice  ancef x 24h  CBC in RR and AM  f/u 2 weeks  DVT ppx- early ambulation  venodynes  PT/OT  anticipate DC home tomorrow

## 2022-07-31 NOTE — H&P ADULT - HISTORY OF PRESENT ILLNESS
47yMale RHD c/o L forearm pain s/p MF playing soccer today. Went to [facility] where he was transferred over. Patient denies head hit or LOC. Endorses tingling in distal portion of L 5th digit. Patient denies any other injuries. Received ancef and tetanus at prior institution    PMH:    PSH:    AH:    Meds: See med rec    T(C): 37.2 (07-30-22 @ 23:00)  HR: 67 (07-30-22 @ 23:44)  BP: 122/80 (07-30-22 @ 23:44)  RR: 18 (07-30-22 @ 23:44)  SpO2: 99% (07-30-22 @ 23:44)  Wt(kg): --    Gen: NAD  PE LUE:  <1 cm wound over ulnar aspect of mid-volar forearm  visible deformity of forearm,   SILT med/rad/ulnar/axillary  +AIN/PIN/Ulnar/Radial/Musc/Median,   +shoulder/elbow ROM,   wrist ROM limited 2/2 pain,   +radial pulse, soft compartments,    Secondary:  No TTP over bony landmarks, SILT BL, ROM intact BL, distal pulses palpable.    Imaging:  XR demonstrating L BBFA fx    Procedure: L BBFA fx reduced and placed in sugartong splint    A/P: 48 yo M s/p reduction of G1 L BBFA fx, endorsing parasthesia in distal portion of L 5th digit     - Plan for OR in morning  - pain control  - NWB LUE in splint  - Splint precautions  - Ancef q8  - S/p tetanus at prior hospital  - NPO/IVF  - FU medicine clearance

## 2022-07-31 NOTE — PRE-ANESTHESIA EVALUATION ADULT - NSANTHOSAYNRD_GEN_A_CORE
No. MARJAN screening performed.  STOP BANG Legend: 0-2 = LOW Risk; 3-4 = INTERMEDIATE Risk; 5-8 = HIGH Risk

## 2022-07-31 NOTE — OCCUPATIONAL THERAPY INITIAL EVALUATION ADULT - MD ORDER
"Patient arrives via EMS. EMS reports they were called with reports of patient drinking with friends. Patient fell and difficulty standing up. Friends were concerned and called. Patient complained of shortness of breath. Pt reports she \"always has shortness of breath and asthma.\" EMS reports LS clear 100% on room air. BG 98 PTA. According to [id number] with EMS. Denies taking BP meds due to inability to pay. NSR for EMS. Denies chest pain at this time.   " Occupational therapy to evaluate and treat.

## 2022-07-31 NOTE — CHART NOTE - NSCHARTNOTEFT_GEN_A_CORE
POC    No complaints; Denies SOB/CP/N/V; Pain controlled;   Block still in effect      T(C): 36.7 (07-31-22 @ 12:00)  T(F): 98.1 (07-31-22 @ 12:00)  HR: 74 (07-31-22 @ 12:00)  BP: 122/82 (07-31-22 @ 12:00)  RR: 18 (07-31-22 @ 12:00)  SpO2: 99% (07-31-22 @ 12:00)  Wt(kg): --    Physical Exam  Gen: NAD    LUE:   Dressing CDI; Sling on  Drain yes [ ]  No [ x]  Decreased motor and sensory 2/2 anesthesia / block  digits: warm / well-perfused  2+ Radial pulse   Cap refill < 2 secs                          12.9<L>  11.27<H> )-----------( 206      ( 31 Jul 2022 10:54 )             38.5<L>    07-31    138  |  104  |  23  ----------------------------<  123<H>  4.3   |  21<L>  |  1.10        A/P: 47y Male s/p  I&D / Open Reduction Internal Fixation / Surgical Repair BB Fracture L forearm    -Pain control; Ice prn; Elevate  -DVT ppx; IS  -Am labs  - sling for comfort   -PT eval: NWB in sling/immobilizer      ROM exercises OK       24 hrs of Abx     -Dispo planning: anticipate d/c in am

## 2022-07-31 NOTE — PROGRESS NOTE ADULT - ASSESSMENT
A/p: 47y Male s/p L Forearm both bone open Fx.  VSS. NAD.    PT/OT- NWB RUE, To be redetermined post op  NPO for scheduled L forearm ORIF with Dr. Angelo today.  IS  DVT PPx: To be determine post operatively.  Pain Control  Continue Current Tx.  Post operative check    Lawrence Garcia PA-C  Orthopedic Surgery Team  Team Pager: [phone] A/p: 47y Male s/p L Forearm both bone open Fx.  VSS. NAD.    PT/OT- NWB LUE, To be redetermined post op  NPO for scheduled L forearm ORIF with Dr. Angelo today.  IS  DVT PPx: To be determine post operatively.  Pain Control  Continue Current Tx.  Post operative check    Lawrence Garcia PA-C  Orthopedic Surgery Team  Team Pager: [phone]

## 2022-07-31 NOTE — OCCUPATIONAL THERAPY INITIAL EVALUATION ADULT - RANGE OF MOTION EXAMINATION, UPPER EXTREMITY
Left UE: pt unable to perform any AROM to LUE/Right UE Active ROM was WFL (within functional limits)

## 2022-08-01 ENCOUNTER — TRANSCRIPTION ENCOUNTER (OUTPATIENT)
Age: 47
End: 2022-08-01

## 2022-08-01 VITALS
DIASTOLIC BLOOD PRESSURE: 80 MMHG | SYSTOLIC BLOOD PRESSURE: 126 MMHG | OXYGEN SATURATION: 98 % | TEMPERATURE: 99 F | RESPIRATION RATE: 18 BRPM | HEART RATE: 70 BPM

## 2022-08-01 LAB
ANION GAP SERPL CALC-SCNC: 11 MMOL/L — SIGNIFICANT CHANGE UP (ref 5–17)
BUN SERPL-MCNC: 18 MG/DL — SIGNIFICANT CHANGE UP (ref 7–23)
CALCIUM SERPL-MCNC: 8.9 MG/DL — SIGNIFICANT CHANGE UP (ref 8.4–10.5)
CHLORIDE SERPL-SCNC: 104 MMOL/L — SIGNIFICANT CHANGE UP (ref 96–108)
CO2 SERPL-SCNC: 24 MMOL/L — SIGNIFICANT CHANGE UP (ref 22–31)
CREAT SERPL-MCNC: 1.12 MG/DL — SIGNIFICANT CHANGE UP (ref 0.5–1.3)
EGFR: 81 ML/MIN/1.73M2 — SIGNIFICANT CHANGE UP
GLUCOSE SERPL-MCNC: 108 MG/DL — HIGH (ref 70–99)
HCT VFR BLD CALC: 37.9 % — LOW (ref 39–50)
HGB BLD-MCNC: 12.6 G/DL — LOW (ref 13–17)
MCHC RBC-ENTMCNC: 30 PG — SIGNIFICANT CHANGE UP (ref 27–34)
MCHC RBC-ENTMCNC: 33.2 GM/DL — SIGNIFICANT CHANGE UP (ref 32–36)
MCV RBC AUTO: 90.2 FL — SIGNIFICANT CHANGE UP (ref 80–100)
NRBC # BLD: 0 /100 WBCS — SIGNIFICANT CHANGE UP (ref 0–0)
PLATELET # BLD AUTO: 201 K/UL — SIGNIFICANT CHANGE UP (ref 150–400)
POTASSIUM SERPL-MCNC: 4.4 MMOL/L — SIGNIFICANT CHANGE UP (ref 3.5–5.3)
POTASSIUM SERPL-SCNC: 4.4 MMOL/L — SIGNIFICANT CHANGE UP (ref 3.5–5.3)
RBC # BLD: 4.2 M/UL — SIGNIFICANT CHANGE UP (ref 4.2–5.8)
RBC # FLD: 11.9 % — SIGNIFICANT CHANGE UP (ref 10.3–14.5)
SODIUM SERPL-SCNC: 139 MMOL/L — SIGNIFICANT CHANGE UP (ref 135–145)
WBC # BLD: 14.86 K/UL — HIGH (ref 3.8–10.5)
WBC # FLD AUTO: 14.86 K/UL — HIGH (ref 3.8–10.5)

## 2022-08-01 PROCEDURE — 85610 PROTHROMBIN TIME: CPT

## 2022-08-01 PROCEDURE — 82040 ASSAY OF SERUM ALBUMIN: CPT

## 2022-08-01 PROCEDURE — 96375 TX/PRO/DX INJ NEW DRUG ADDON: CPT

## 2022-08-01 PROCEDURE — 93005 ELECTROCARDIOGRAM TRACING: CPT

## 2022-08-01 PROCEDURE — 80053 COMPREHEN METABOLIC PANEL: CPT

## 2022-08-01 PROCEDURE — 86900 BLOOD TYPING SEROLOGIC ABO: CPT

## 2022-08-01 PROCEDURE — C1713: CPT

## 2022-08-01 PROCEDURE — 82306 VITAMIN D 25 HYDROXY: CPT

## 2022-08-01 PROCEDURE — 86901 BLOOD TYPING SEROLOGIC RH(D): CPT

## 2022-08-01 PROCEDURE — 73110 X-RAY EXAM OF WRIST: CPT

## 2022-08-01 PROCEDURE — C1889: CPT

## 2022-08-01 PROCEDURE — 73090 X-RAY EXAM OF FOREARM: CPT

## 2022-08-01 PROCEDURE — 85025 COMPLETE CBC W/AUTO DIFF WBC: CPT

## 2022-08-01 PROCEDURE — 97161 PT EVAL LOW COMPLEX 20 MIN: CPT

## 2022-08-01 PROCEDURE — 76000 FLUOROSCOPY <1 HR PHYS/QHP: CPT

## 2022-08-01 PROCEDURE — 85730 THROMBOPLASTIN TIME PARTIAL: CPT

## 2022-08-01 PROCEDURE — 80048 BASIC METABOLIC PNL TOTAL CA: CPT

## 2022-08-01 PROCEDURE — 87637 SARSCOV2&INF A&B&RSV AMP PRB: CPT

## 2022-08-01 PROCEDURE — 36415 COLL VENOUS BLD VENIPUNCTURE: CPT

## 2022-08-01 PROCEDURE — 85027 COMPLETE CBC AUTOMATED: CPT

## 2022-08-01 PROCEDURE — 97165 OT EVAL LOW COMPLEX 30 MIN: CPT

## 2022-08-01 PROCEDURE — 86850 RBC ANTIBODY SCREEN: CPT

## 2022-08-01 PROCEDURE — 71045 X-RAY EXAM CHEST 1 VIEW: CPT

## 2022-08-01 PROCEDURE — 73080 X-RAY EXAM OF ELBOW: CPT

## 2022-08-01 PROCEDURE — 96374 THER/PROPH/DIAG INJ IV PUSH: CPT

## 2022-08-01 PROCEDURE — 99284 EMERGENCY DEPT VISIT MOD MDM: CPT | Mod: 25

## 2022-08-01 RX ORDER — ACETAMINOPHEN 500 MG
3 TABLET ORAL
Qty: 0 | Refills: 0 | DISCHARGE
Start: 2022-08-01

## 2022-08-01 RX ORDER — OXYCODONE HYDROCHLORIDE 5 MG/1
1 TABLET ORAL
Qty: 30 | Refills: 0
Start: 2022-08-01

## 2022-08-01 RX ORDER — TRAMADOL HYDROCHLORIDE 50 MG/1
1 TABLET ORAL
Qty: 21 | Refills: 0
Start: 2022-08-01 | End: 2022-08-07

## 2022-08-01 RX ORDER — SENNA PLUS 8.6 MG/1
2 TABLET ORAL
Qty: 0 | Refills: 0 | DISCHARGE
Start: 2022-08-01

## 2022-08-01 RX ADMIN — OXYCODONE HYDROCHLORIDE 10 MILLIGRAM(S): 5 TABLET ORAL at 05:47

## 2022-08-01 RX ADMIN — Medication 975 MILLIGRAM(S): at 06:09

## 2022-08-01 RX ADMIN — OXYCODONE HYDROCHLORIDE 10 MILLIGRAM(S): 5 TABLET ORAL at 05:17

## 2022-08-01 RX ADMIN — Medication 975 MILLIGRAM(S): at 06:39

## 2022-08-01 RX ADMIN — OXYCODONE HYDROCHLORIDE 10 MILLIGRAM(S): 5 TABLET ORAL at 09:14

## 2022-08-01 RX ADMIN — Medication 100 MILLIGRAM(S): at 06:08

## 2022-08-01 NOTE — DISCHARGE NOTE PROVIDER - NSDCMRMEDTOKEN_GEN_ALL_CORE_FT
acetaminophen 325 mg oral tablet: 3 tab(s) orally every 8 hours x 7 days then as needed  oxyCODONE 5 mg oral tablet: 1 tab(s) orally every 4 hours, As needed, Moderate Pain (4 - 6) MDD:4  senna leaf extract oral tablet: 2 tab(s) orally once a day (at bedtime), As Needed  traMADol 50 mg oral tablet: 1 tab(s) orally every 8 hours, As needed, Mild Pain (1 - 3) MDD:3

## 2022-08-01 NOTE — PROGRESS NOTE ADULT - ASSESSMENT
46 yo male presents with an open left forearm fracture. Patient is s/p an Open reduction and internal fixation of the left forearm

## 2022-08-01 NOTE — DISCHARGE NOTE PROVIDER - HOSPITAL COURSE
47yMale RHD c/o L forearm pain s/p Mechanical fall playing soccer today. Went to [facility] where he was transferred to [facility] for definitive treatment.   Patient denies head hit or LOC. Endorses tingling in distal portion of L 5th digit. Patient denies any other injuries. Received ancef and tetanus at prior institution    PMH: denies  All: denies      7/30/22: Patient admitted with BB fracture of left fore arm. Patient was admitted and medically cleared for surgery.  7/31/22: Patient was taken to surgery underwent an ORIF Left fore arm, tolerated procedure without complications.  Patient was evaluated by Physical therapy whom recommended home without patient PT.  Patient requires 24 hours of antibiotic after surgery 2nd to open fracture when completed stable for discharge.

## 2022-08-01 NOTE — DISCHARGE NOTE PROVIDER - NSDCCPCAREPLAN_GEN_ALL_CORE_FT
PRINCIPAL DISCHARGE DIAGNOSIS  Diagnosis: Fracture of shafts of both ulna and radius, open, left, type I or II, initial encounter  Assessment and Plan of Treatment:

## 2022-08-01 NOTE — PROGRESS NOTE ADULT - PROBLEM SELECTOR PLAN 1
Patient with a both bone fracture of the left arm  Patient s/p Open reduction and internal fixation of the left forearm  tolerated the procedure well  PO as tolerated  further abx as per Ortho  DVT prophylaxis.

## 2022-08-01 NOTE — DISCHARGE NOTE PROVIDER - NSDCFUADDINST_GEN_ALL_CORE_FT
Dressing will be changed during office visit.   Out of bed, ambulate, non-weight bearing left upper extremity

## 2022-08-01 NOTE — PROGRESS NOTE ADULT - SUBJECTIVE AND OBJECTIVE BOX
Orthopedic Surgery Progress Note     S: Patient seen and examined today. No acute events overnight. Pain is well controlled. Denies f/c, chest pain, shortness of breath, dizziness.    MEDICATIONS  (STANDING):  acetaminophen     Tablet .. 975 milliGRAM(s) Oral every 8 hours  famotidine    Tablet 20 milliGRAM(s) Oral two times a day  senna 2 Tablet(s) Oral at bedtime  sodium chloride 0.9%. 1000 milliLiter(s) (125 mL/Hr) IV Continuous <Continuous>    MEDICATIONS  (PRN):  acetaminophen   IVPB .. 1000 milliGRAM(s) IV Intermittent once PRN Severe Pain (7 - 10)  magnesium hydroxide Suspension 30 milliLiter(s) Oral daily PRN Constipation  melatonin 3 milliGRAM(s) Oral at bedtime PRN Insomnia  oxyCODONE    IR 5 milliGRAM(s) Oral every 4 hours PRN Moderate Pain (4 - 6)  oxyCODONE    IR 10 milliGRAM(s) Oral every 4 hours PRN Severe Pain (7 - 10)  traMADol 50 milliGRAM(s) Oral every 8 hours PRN Mild Pain (1 - 3)      Physical Exam:    Vital Signs Last 24 Hrs  T(C): 36.8 (01 Aug 2022 05:12), Max: 37.1 (31 Jul 2022 07:12)  T(F): 98.2 (01 Aug 2022 05:12), Max: 98.5 (31 Jul 2022 21:09)  HR: 67 (01 Aug 2022 05:12) (67 - 93)  BP: 123/81 (01 Aug 2022 05:12) (109/66 - 125/80)  BP(mean): 93 (31 Jul 2022 11:30) (82 - 93)  RR: 18 (01 Aug 2022 05:12) (17 - 20)  SpO2: 98% (01 Aug 2022 05:12) (97% - 100%)    Parameters below as of 01 Aug 2022 05:12  Patient On (Oxygen Delivery Method): room air        07-31-22 @ 07:01  -  08-01-22 @ 06:13  --------------------------------------------------------  IN: 1360 mL / OUT: 750 mL / NET: 610 mL      PHYSICAL EXAM:  LUE:  ACE wrapped, soft dressing in sling  WWP  SILT r/m/u  firing PIN w decreased strength; AIN, IO intact  compartments soft and compressible            LABS:                        12.9   11.27 )-----------( 206      ( 31 Jul 2022 10:54 )             38.5     07-31    138  |  104  |  23  ----------------------------<  123<H>  4.3   |  21<L>  |  1.10    Ca    8.7      31 Jul 2022 10:54    TPro  x   /  Alb  4.2  /  TBili  x   /  DBili  x   /  AST  x   /  ALT  x   /  AlkPhos  x   07-31

## 2022-08-01 NOTE — DISCHARGE NOTE PROVIDER - NSDCFUADDAPPT_GEN_ALL_CORE_FT
Please call Dr. Angelo' s office within next few days to schedule a follow up appointment  after surgery.   Recommend follow up with medical MD, within next 4 weeks.

## 2022-08-01 NOTE — DISCHARGE NOTE NURSING/CASE MANAGEMENT/SOCIAL WORK - NSDCPEFALRISK_GEN_ALL_CORE
For information on Fall & Injury Prevention, visit: [email or web address] OR  [email or web address] OR  https://www.cdc.gov/steadi/patient.html

## 2022-08-01 NOTE — PROGRESS NOTE ADULT - TIME BILLING
Patient DCed home  continue current meds  PO as tolerated  weight bearing as per ortho  follow up with ortho  Patient aware of plan

## 2022-08-01 NOTE — DISCHARGE NOTE PROVIDER - CARE PROVIDER_API CALL
David Angelo)  Orthopaedic Surgery  [address], [address]  [place] [address]  Phone: [phone]  Fax: [phone]  Follow Up Time:     Eduardo Fuller [facility]  [address] [place] [address]  Phone: [phone]  Fax: [phone]  Follow Up Time:

## 2022-08-01 NOTE — PROGRESS NOTE ADULT - ASSESSMENT
47M s/p ORIF and I&D of L g1 both bone forearm fx. POD#1.     - Abx: Ancef x24hrs post op for open fx  - NWB LUE in soft dressing  - PT/OT  - compartment checks  - Diet  - SCDs  - Dispo planning

## 2022-08-01 NOTE — PROGRESS NOTE ADULT - SUBJECTIVE AND OBJECTIVE BOX
47y M presents to the ED as a transfer from [place] for reduction and splinting after L ulnar and radial fracture. Pt reports L arm pain s/p mechanical trip and fall while playing soccer. Pt states FOOSHing on L arm after colliding w/ two defenders. Pt saw bone sticking out of skin and placed it back immediately. Pt was given Ancef and Dilaudid (1mg) at 7:00 PM. Denies injury to shoulder, elbow, other arm, numbness, tingling, weakness. Pt received TDAP while at [place]. Patient was transfered to [facility] for further treatment and is s/p an Open reduction and internal fixation of the left arm. Patient seen now resting comfortably. Tolerated the procedure well      MEDICATIONS  (STANDING):    MEDICATIONS  (PRN):          VITALS:   T(C): 37 (08-01-22 @ 09:04), Max: 37 (08-01-22 @ 09:04)  HR: 70 (08-01-22 @ 09:04) (67 - 70)  BP: 126/80 (08-01-22 @ 09:04) (123/81 - 126/80)  RR: 18 (08-01-22 @ 09:04) (18 - 18)  SpO2: 98% (08-01-22 @ 09:04) (98% - 98%)  Wt(kg): --      PHYSICAL EXAM:  GENERAL: NAD, well-groomed, well-developed  HEAD:  Atraumatic, Normocephalic  EYES: EOMI, PERRLA, conjunctiva and sclera clear  ENMT: No tonsillar erythema, exudates, or enlargement; Moist mucous membranes, Good dentition, No lesions  NECK: Supple, No JVD, Normal thyroid  NERVOUS SYSTEM:  Alert & Oriented X3, Good concentration; Motor Strength 5/5 B/L upper and lower extremities; DTRs 2+ intact and symmetric  CHEST/LUNG: Clear to percussion bilaterally; No rales, rhonchi, wheezing, or rubs  HEART: Regular rate and rhythm; No murmurs, rubs, or gallops  ABDOMEN: Soft, Nontender, Nondistended; Bowel sounds present  EXTREMITIES:  2+ Peripheral Pulses, No clubbing, cyanosis, or edema  LYMPH: No lymphadenopathy noted  SKIN: No rashes or lesions  LABS:        CBC Full  -  ( 01 Aug 2022 07:20 )  WBC Count : 14.86 K/uL  RBC Count : 4.20 M/uL  Hemoglobin : 12.6 g/dL  Hematocrit : 37.9 %  Platelet Count - Automated : 201 K/uL  Mean Cell Volume : 90.2 fl  Mean Cell Hemoglobin : 30.0 pg  Mean Cell Hemoglobin Concentration : 33.2 gm/dL  Auto Neutrophil # : x  Auto Lymphocyte # : x  Auto Monocyte # : x  Auto Eosinophil # : x  Auto Basophil # : x  Auto Neutrophil % : x  Auto Lymphocyte % : x  Auto Monocyte % : x  Auto Eosinophil % : x  Auto Basophil % : x    08-01    139  |  104  |  18  ----------------------------<  108<H>  4.4   |  24  |  1.12    Ca    8.9      01 Aug 2022 07:25    TPro  x   /  Alb  4.2  /  TBili  x   /  DBili  x   /  AST  x   /  ALT  x   /  AlkPhos  x   07-31    LIVER FUNCTIONS - ( 31 Jul 2022 01:37 )  Alb: 4.2 g/dL / Pro: x     / ALK PHOS: x     / ALT: x     / AST: x     / GGT: x           PT/INR - ( 31 Jul 2022 01:37 )   PT: 13.4 sec;   INR: 1.16 ratio         PTT - ( 31 Jul 2022 01:37 )  PTT:23.0 sec    CAPILLARY BLOOD GLUCOSE          RADIOLOGY & ADDITIONAL TESTS:

## 2022-08-01 NOTE — PROGRESS NOTE ADULT - ATTENDING COMMENTS
PT. DVT ppx. f/u labs. DC planning. f/u 1-2 weeks. can be dc after 24h abx
For BAHMANF L BBFA and I and D.  R/B/A discussed

## 2022-08-01 NOTE — DISCHARGE NOTE NURSING/CASE MANAGEMENT/SOCIAL WORK - PATIENT PORTAL LINK FT
You can access the FollowMyHealth Patient Portal offered by [facility] by registering at the following website: [email or web address]. By joining [employer]’s FollowMyHealth portal, you will also be able to view your health information using other applications (apps) compatible with our system.

## 2022-08-04 PROBLEM — Z78.9 OTHER SPECIFIED HEALTH STATUS: Chronic | Status: ACTIVE | Noted: 2022-07-30

## 2022-08-04 PROBLEM — Z00.00 ENCOUNTER FOR PREVENTIVE HEALTH EXAMINATION: Status: ACTIVE | Noted: 2022-08-04

## 2022-08-14 ENCOUNTER — TRANSCRIPTION ENCOUNTER (OUTPATIENT)
Age: 47
End: 2022-08-14

## 2022-08-15 ENCOUNTER — APPOINTMENT (OUTPATIENT)
Dept: ORTHOPEDIC SURGERY | Facility: CLINIC | Age: 47
End: 2022-08-15

## 2022-08-15 PROCEDURE — 99024 POSTOP FOLLOW-UP VISIT: CPT

## 2022-08-15 PROCEDURE — 73090 X-RAY EXAM OF FOREARM: CPT | Mod: LT

## 2022-08-15 RX ORDER — GABAPENTIN 100 MG/1
100 CAPSULE ORAL 3 TIMES DAILY
Qty: 90 | Refills: 0 | Status: ACTIVE | COMMUNITY
Start: 2022-08-15 | End: 1900-01-01

## 2022-08-29 ENCOUNTER — APPOINTMENT (OUTPATIENT)
Dept: ORTHOPEDIC SURGERY | Facility: CLINIC | Age: 47
End: 2022-08-29

## 2022-08-29 VITALS
WEIGHT: 160 LBS | BODY MASS INDEX: 23.7 KG/M2 | DIASTOLIC BLOOD PRESSURE: 92 MMHG | SYSTOLIC BLOOD PRESSURE: 129 MMHG | HEIGHT: 69 IN | HEART RATE: 67 BPM

## 2022-08-29 PROCEDURE — 99024 POSTOP FOLLOW-UP VISIT: CPT

## 2022-08-29 PROCEDURE — 73090 X-RAY EXAM OF FOREARM: CPT | Mod: LT

## 2022-09-04 NOTE — HISTORY OF PRESENT ILLNESS
[de-identified] : S/P I and D and ORIF L open radius and ulna fractures 7/31/22  [de-identified] : S/P I and D and ORIF L open radius and ulna fractures 7/31/22  Post operatively he sustained a PIN palsy. He reports that the dysesthesia on the dorsum of the hand and fingers is resolving. He has begun therapy and feels he is making some improvement.  [de-identified] : physical exam R UE \par  fracture blistering on the dorsum lateral and volar forearm now fully healed. \par incisions and traumatic laceration are CDI healed erythema drainage or induration. \par Sensation is grossly intact ( there is subjective dysesthesia over the dorsal aspects of the index middle and ring fingers and dorsum of the hand which is resolving) \par He is able to flex his DIP PIP and CMC joints He is unable to abduct or extend the DIP PIP and CMC joints of his fingers He now has 2/5 thumb abduction. He is able to flex and extend his wrist.   [de-identified] : 2 views of the L forearm taken today and reviewed  show appropriately positioned hardware on the radius and ulna with restoration of radial bow. hardware is in good position no signs of mechanical failure  [de-identified] : S/P I and D and ORIF L open radius and ulna fractures 7/31/22\par - pin palsy of left hand   [de-identified] : - Continue ROMAT and hand therapy new script provided \par - F/U in 4 weeks with XR at that time

## 2022-10-03 ENCOUNTER — APPOINTMENT (OUTPATIENT)
Dept: ORTHOPEDIC SURGERY | Facility: CLINIC | Age: 47
End: 2022-10-03

## 2022-10-03 PROCEDURE — 99024 POSTOP FOLLOW-UP VISIT: CPT

## 2022-11-14 ENCOUNTER — APPOINTMENT (OUTPATIENT)
Dept: ORTHOPEDIC SURGERY | Facility: CLINIC | Age: 47
End: 2022-11-14

## 2022-11-14 PROCEDURE — 73090 X-RAY EXAM OF FOREARM: CPT | Mod: LT

## 2022-11-14 PROCEDURE — 99213 OFFICE O/P EST LOW 20 MIN: CPT

## 2023-01-09 ENCOUNTER — APPOINTMENT (OUTPATIENT)
Dept: ORTHOPEDIC SURGERY | Facility: CLINIC | Age: 48
End: 2023-01-09
Payer: COMMERCIAL

## 2023-01-09 VITALS
BODY MASS INDEX: 23.99 KG/M2 | TEMPERATURE: 97.9 F | DIASTOLIC BLOOD PRESSURE: 91 MMHG | HEART RATE: 72 BPM | SYSTOLIC BLOOD PRESSURE: 131 MMHG | HEIGHT: 69 IN | WEIGHT: 162 LBS | OXYGEN SATURATION: 98 %

## 2023-01-09 DIAGNOSIS — S52.201E: ICD-10-CM

## 2023-01-09 DIAGNOSIS — S52.301E: ICD-10-CM

## 2023-01-09 DIAGNOSIS — S54.8X2D: ICD-10-CM

## 2023-01-09 PROCEDURE — 99213 OFFICE O/P EST LOW 20 MIN: CPT

## 2023-01-09 PROCEDURE — 73090 X-RAY EXAM OF FOREARM: CPT | Mod: LT

## 2023-01-13 PROBLEM — S52.301E: Status: ACTIVE | Noted: 2022-08-12

## 2023-01-13 PROBLEM — S54.8X2D: Status: ACTIVE | Noted: 2022-08-15

## 2023-03-29 NOTE — ED ADULT NURSE NOTE - OBJECTIVE STATEMENT
48 yo M pt presents from [facility] for L Radial/Ulnar open fx. pt was playing soccer when he fell on his L hand. pt was given Dilauded 1 mg and 1 G of Ancef at [facility].  pt is A&Ox4, NIDIA, FROM in L hand PMS intact, obvious deformity noted to mid forearm.  Pt denies headache, dizziness, chest pain, palpitations, cough, SOB, abdominal pain, n/v/d, urinary symptoms, fevers, chills, weakness at this time. [facility] Postcare Instructions: The HEMIGARD strips are to remain completely dry for at least 5-7 days.

## 2023-04-10 ENCOUNTER — APPOINTMENT (OUTPATIENT)
Dept: ORTHOPEDIC SURGERY | Facility: CLINIC | Age: 48
End: 2023-04-10

## 2023-07-26 NOTE — ED PROVIDER NOTE - IV ALTEPLASE ADMIN OUTSIDE HIDDEN
Take 15 mg midodrine every 8 hours to bring up your blood pressure  Follow-up with your PCP asap    Thank you for trusting [facility] and me with your care.  We are honored that you entrusted us with your healthcare needs. Your satisfaction is very important to us and we hope you have been very pleased with your experience at [facility]. After your discharge you may receive a survey asking you to rate your hospital experience. We encourage you to take the time to complete the survey as your feedback allows us to identify areas for improvement as well as recognize our staff for their care. We hope that you have received the very best care possible during your hospitalization at [facility], as your satisfaction is our top priority.    Let me know if there is anything more I can do!!        Armando So MD  Internal Medicine Staff        PATIENT GENERAL DISCHARGE INFORMATION   Things that YOU are responsible for to Manage Your Care At Home:  1. Getting your prescriptions filled.  2. Taking you medications as directed. (DO NOT MISS ANY DOSES!)  3. Going to your follow-up doctor appointments.                 *This is important because it allows the doctor to monitor your progress and make changes.      If you are unable to make your follow up appointments, please call the number listed and reschedule this appointment.   After discharge, if you need assistance, you can call [facility] On [facility] Nurse Care Line for 24/7 assistance at [phone]  If you are experience any signs or symptoms, Call your doctor or Call 911 and come to your nearest Emergency Room.    You should receive a call from [facility] within 48-72 hours to help manage your care after discharge.   Please try to make sure that you answer your phone for this important phone call.      show

## 2025-08-01 ENCOUNTER — NON-APPOINTMENT (OUTPATIENT)
Age: 50
End: 2025-08-01

## 2025-08-01 ENCOUNTER — APPOINTMENT (OUTPATIENT)
Dept: INTERNAL MEDICINE | Facility: CLINIC | Age: 50
End: 2025-08-01
Payer: COMMERCIAL

## 2025-08-01 VITALS
BODY MASS INDEX: 25.18 KG/M2 | HEART RATE: 64 BPM | DIASTOLIC BLOOD PRESSURE: 93 MMHG | HEIGHT: 69 IN | SYSTOLIC BLOOD PRESSURE: 133 MMHG | OXYGEN SATURATION: 100 % | TEMPERATURE: 98.1 F | WEIGHT: 170 LBS

## 2025-08-01 DIAGNOSIS — Z12.11 ENCOUNTER FOR SCREENING FOR MALIGNANT NEOPLASM OF COLON: ICD-10-CM

## 2025-08-01 DIAGNOSIS — Z00.00 ENCOUNTER FOR GENERAL ADULT MEDICAL EXAMINATION W/OUT ABNORMAL FINDINGS: ICD-10-CM

## 2025-08-01 DIAGNOSIS — Z86.19 PERSONAL HISTORY OF OTHER INFECTIOUS AND PARASITIC DISEASES: ICD-10-CM

## 2025-08-01 DIAGNOSIS — Z28.21 IMMUNIZATION NOT CARRIED OUT BECAUSE OF PATIENT REFUSAL: ICD-10-CM

## 2025-08-01 PROCEDURE — 93000 ELECTROCARDIOGRAM COMPLETE: CPT

## 2025-08-01 PROCEDURE — 99386 PREV VISIT NEW AGE 40-64: CPT

## 2025-08-01 PROCEDURE — 36415 COLL VENOUS BLD VENIPUNCTURE: CPT

## 2025-08-01 RX ORDER — VALACYCLOVIR 500 MG/1
500 TABLET, FILM COATED ORAL TWICE DAILY
Qty: 6 | Refills: 0 | Status: ACTIVE | COMMUNITY
Start: 2025-08-01 | End: 1900-01-01

## 2025-08-04 DIAGNOSIS — E78.2 MIXED HYPERLIPIDEMIA: ICD-10-CM

## 2025-08-04 DIAGNOSIS — R74.01 ELEVATION OF LEVELS OF LIVER TRANSAMINASE LEVELS: ICD-10-CM

## 2025-08-04 LAB
25(OH)D3 SERPL-MCNC: 24.7 NG/ML
ALBUMIN SERPL ELPH-MCNC: 4.4 G/DL
ALP BLD-CCNC: 104 U/L
ALT SERPL-CCNC: 62 U/L
ANION GAP SERPL CALC-SCNC: 12 MMOL/L
APPEARANCE: CLEAR
AST SERPL-CCNC: 30 U/L
BASOPHILS # BLD AUTO: 0.04 K/UL
BASOPHILS NFR BLD AUTO: 0.5 %
BILIRUB SERPL-MCNC: 1 MG/DL
BILIRUBIN URINE: NEGATIVE
BLOOD URINE: NEGATIVE
BUN SERPL-MCNC: 18 MG/DL
CALCIUM SERPL-MCNC: 10 MG/DL
CHLORIDE SERPL-SCNC: 105 MMOL/L
CHOLEST SERPL-MCNC: 265 MG/DL
CO2 SERPL-SCNC: 25 MMOL/L
COLOR: YELLOW
CREAT SERPL-MCNC: 1.01 MG/DL
EGFRCR SERPLBLD CKD-EPI 2021: 90 ML/MIN/1.73M2
EOSINOPHIL # BLD AUTO: 0.08 K/UL
EOSINOPHIL NFR BLD AUTO: 0.9 %
ESTIMATED AVERAGE GLUCOSE: 105 MG/DL
FOLATE SERPL-MCNC: 5.1 NG/ML
GLUCOSE QUALITATIVE U: NEGATIVE MG/DL
GLUCOSE SERPL-MCNC: 94 MG/DL
HBA1C MFR BLD HPLC: 5.3 %
HCT VFR BLD CALC: 46 %
HDLC SERPL-MCNC: 68 MG/DL
HGB BLD-MCNC: 15.2 G/DL
IMM GRANULOCYTES NFR BLD AUTO: 0.6 %
KETONES URINE: NEGATIVE MG/DL
LDLC SERPL-MCNC: 186 MG/DL
LEUKOCYTE ESTERASE URINE: NEGATIVE
LYMPHOCYTES # BLD AUTO: 3.05 K/UL
LYMPHOCYTES NFR BLD AUTO: 35 %
MAN DIFF?: NORMAL
MCHC RBC-ENTMCNC: 29.6 PG
MCHC RBC-ENTMCNC: 33 G/DL
MCV RBC AUTO: 89.5 FL
MONOCYTES # BLD AUTO: 0.5 K/UL
MONOCYTES NFR BLD AUTO: 5.7 %
NEUTROPHILS # BLD AUTO: 4.99 K/UL
NEUTROPHILS NFR BLD AUTO: 57.3 %
NITRITE URINE: NEGATIVE
NONHDLC SERPL-MCNC: 197 MG/DL
PH URINE: 7
PLATELET # BLD AUTO: 238 K/UL
POTASSIUM SERPL-SCNC: 4.5 MMOL/L
PROT SERPL-MCNC: 7 G/DL
PROTEIN URINE: NEGATIVE MG/DL
PSA SERPL-MCNC: 0.51 NG/ML
RBC # BLD: 5.14 M/UL
RBC # FLD: 12.2 %
SODIUM SERPL-SCNC: 141 MMOL/L
SPECIFIC GRAVITY URINE: 1.01
TRIGL SERPL-MCNC: 66 MG/DL
TSH SERPL-ACNC: 3.78 UIU/ML
UROBILINOGEN URINE: 0.2 MG/DL
VIT B12 SERPL-MCNC: 482 PG/ML
WBC # FLD AUTO: 8.71 K/UL

## 2025-08-05 ENCOUNTER — NON-APPOINTMENT (OUTPATIENT)
Age: 50
End: 2025-08-05

## (undated) DEVICE — DRAIN JACKSON PRATT 3 SPRING RESERVOIR W 15FR PVC DRAIN

## (undated) DEVICE — POSITIONER FOAM EGG CRATE ULNAR 2PCS (PINK)

## (undated) DEVICE — DRAIN JACKSON PRATT 10FR ROUND END NO TROCAR

## (undated) DEVICE — GLV 8 PROTEXIS (WHITE)

## (undated) DEVICE — DRSG STERISTRIPS 0.5 X 4"

## (undated) DEVICE — DRAIN RESERVOIR FOR JACKSON PRATT 100CC CARDINAL

## (undated) DEVICE — SUT SURGIPRO 3-0 18" P-12

## (undated) DEVICE — DRAIN JACKSON PRATT 7MM FLAT FULL W 15 FR TROCAR

## (undated) DEVICE — MEDICATION LABELS W MARKER

## (undated) DEVICE — DRAPE 3/4 SHEET W REINFORCEMENT 56X77"

## (undated) DEVICE — CANISTER KCI 500ML GEL SENSA TRAC

## (undated) DEVICE — SUT POLYSORB 0 30" GS-21 UNDYED

## (undated) DEVICE — DRAPE C ARM UNIVERSAL

## (undated) DEVICE — DRILL BIT SYNTHES ORTHO 1.8MM

## (undated) DEVICE — CUFF TOURNIQUET 24" SINGLE PORT W PLC

## (undated) DEVICE — DRSG ACE BANDAGE 4" NS

## (undated) DEVICE — DRSG WEBRIL 4"

## (undated) DEVICE — PACK EXTREMITY

## (undated) DEVICE — DRILL BIT SYNTHES ORTHO QUICK COUPLING 2.8X165MM

## (undated) DEVICE — VISITEC 4X4

## (undated) DEVICE — DRILL BIT SYNTHES MAXILLOFACIAL QC 2.4X100MM

## (undated) DEVICE — BLADE SCALPEL SAFETYLOCK #15

## (undated) DEVICE — GLV 8.5 PROTEXIS (WHITE)

## (undated) DEVICE — DRAPE C ARM C-ARMOUR

## (undated) DEVICE — TOURNIQUET CUFF 18" DUAL PORT SINGLE BLADDER LUER LOCK (BLACK)

## (undated) DEVICE — BIPOLAR FORCEP SYMMETRY BAYONET 7" X 1.5MM SMOOTH (SILVER)

## (undated) DEVICE — STAPLER SKIN VISI-STAT 35 WIDE

## (undated) DEVICE — TOURNIQUET CUFF 30" SINGLE PORT W PLC

## (undated) DEVICE — DRILL BIT SYNTHES ORTHO QC 2.5X110MM

## (undated) DEVICE — GLV 6.5 PROTEXIS (WHITE)

## (undated) DEVICE — TAPE SILK 3"

## (undated) DEVICE — WARMING BLANKET UPPER ADULT

## (undated) DEVICE — DRAIN PENROSE .25" X 18" LATEX

## (undated) DEVICE — POSITIONER CARDIAC BUMP

## (undated) DEVICE — DRSG XEROFORM 5 X 9"

## (undated) DEVICE — SPECIMEN CONTAINER 100ML

## (undated) DEVICE — GLV 7 PROTEXIS (WHITE)

## (undated) DEVICE — SLING SHOULDER IMMOBILIZER CLINIC LARGE

## (undated) DEVICE — SUT MONOSOF 3-0 18" C-14

## (undated) DEVICE — DRSG STOCKINETTE IMPERVIOUS LG

## (undated) DEVICE — TOURNIQUET CUFF 24" DUAL PORT SINGLE BLADDER LUER LOCK  (BLACK)

## (undated) DEVICE — DRSG STOCKINETTE IMPERVIOUS MED

## (undated) DEVICE — MARKING PEN W RULER

## (undated) DEVICE — SUT POLYSORB 2-0 30" GS-21 UNDYED

## (undated) DEVICE — DRAIN JACKSON PRATT 10MM FLAT FULL NO TROCAR

## (undated) DEVICE — DRAPE SPLIT SHEET 77" X 108"

## (undated) DEVICE — DRAPE 1/2 SHEET 40X57"

## (undated) DEVICE — SOL IRR POUR H2O 250ML

## (undated) DEVICE — SOL IRR BAG NS 0.9% 3000ML

## (undated) DEVICE — SLING ARM CHIEFTAIN MESH LARGE

## (undated) DEVICE — GLV 7.5 PROTEXIS (WHITE)

## (undated) DEVICE — SUT POLYSORB 3-0 30" P-12 UNDYED

## (undated) DEVICE — VENODYNE/SCD SLEEVE CALF LARGE

## (undated) DEVICE — BLADE SCALPEL SAFETYLOCK #10

## (undated) DEVICE — ELCTR BIPOLAR CORD J&J 12FT DISP

## (undated) DEVICE — DRAPE TOWEL BLUE 17" X 24"

## (undated) DEVICE — STRYKER INTERPULSE HANDPIECE W IRR SUCTION TUBE

## (undated) DEVICE — GOWN TRIMAX LG

## (undated) DEVICE — DRAPE IOBAN 23" X 23"

## (undated) DEVICE — DRAPE MAYO STAND 30"

## (undated) DEVICE — WARMING BLANKET LOWER ADULT

## (undated) DEVICE — DRSG VAC GRANUFOAM LARGE (BLACK)

## (undated) DEVICE — FOLEY TRAY 16FR 5CC LTX UMETER CLOSED

## (undated) DEVICE — LAP PAD 18 X 18"

## (undated) DEVICE — SOL IRR POUR NS 0.9% 500ML